# Patient Record
Sex: MALE | Race: ASIAN | URBAN - METROPOLITAN AREA
[De-identification: names, ages, dates, MRNs, and addresses within clinical notes are randomized per-mention and may not be internally consistent; named-entity substitution may affect disease eponyms.]

---

## 2019-10-18 ENCOUNTER — TRANSFERRED RECORDS (OUTPATIENT)
Dept: HEALTH INFORMATION MANAGEMENT | Facility: CLINIC | Age: 19
End: 2019-10-18

## 2019-10-18 ENCOUNTER — MEDICAL CORRESPONDENCE (OUTPATIENT)
Dept: HEALTH INFORMATION MANAGEMENT | Facility: CLINIC | Age: 19
End: 2019-10-18

## 2019-10-21 NOTE — TELEPHONE ENCOUNTER
FUTURE VISIT INFORMATION      FUTURE VISIT INFORMATION:    Date: 10/25/2019    Time: 1:30 PM    Location: [facility]   REFERRAL INFORMATION:    Referring provider:  Dorothy Poe PA-C    Referring providers clinic:  [facility]    Reason for visit/diagnosis  Phantosmia, Pt smells smoke all the time     RECORDS REQUESTED FROM:       Clinic name Comments Records Status Imaging Status   [facility] 10/18/19 Office visit with Dorothy Poe PA-C, Referral  Scanned in EPIC                  Action 10/21/19 12:28 pm    Action Taken Request sent to [facility] ([phone]) for referral and office notes. Óscar

## 2019-10-25 ENCOUNTER — PRE VISIT (OUTPATIENT)
Dept: OTOLARYNGOLOGY | Facility: CLINIC | Age: 19
End: 2019-10-25

## 2020-07-31 ENCOUNTER — MEDICAL CORRESPONDENCE (OUTPATIENT)
Dept: HEALTH INFORMATION MANAGEMENT | Facility: CLINIC | Age: 20
End: 2020-07-31

## 2020-07-31 ENCOUNTER — TRANSFERRED RECORDS (OUTPATIENT)
Dept: HEALTH INFORMATION MANAGEMENT | Facility: CLINIC | Age: 20
End: 2020-07-31

## 2020-08-03 ENCOUNTER — TRANSCRIBE ORDERS (OUTPATIENT)
Dept: OTHER | Age: 20
End: 2020-08-03

## 2020-08-03 DIAGNOSIS — H93.13 TINNITUS OF BOTH EARS: Primary | ICD-10-CM

## 2021-02-02 ENCOUNTER — NURSE TRIAGE (OUTPATIENT)
Dept: NURSING | Facility: CLINIC | Age: 21
End: 2021-02-02

## 2021-02-02 NOTE — TELEPHONE ENCOUNTER
Angel is having a bad case of tinitis in his ears and bright lights makes him dizzy.  Ringing started yesterday.  Angel states that he will phone [facility] to schedule an appointment.  Denies fever cough and shortness of breath.  Angel does not want to go to ED.    COVID 19 Nurse Triage Plan/Patient Instructions    Please be aware that novel coronavirus (COVID-19) may be circulating in the community. If you develop symptoms such as fever, cough, or SOB or if you have concerns about the presence of another infection including coronavirus (COVID-19), please contact your health care provider or visit [email or web address].     Disposition/Instructions    In-Person Visit with provider recommended. Reference Visit Selection Guide.    Thank you for taking steps to prevent the spread of this virus.  o Limit your contact with others.  o Wear a simple mask to cover your cough.  o Wash your hands well and often.    Resources    [facility]: About COVID-19: [email or web address]    CDC: What to Do If You're Sick: www.cdc.gov/coronavirus/2019-ncov/about/steps-when-sick.html    CDC: Ending Home Isolation: www.cdc.gov/coronavirus/2019-ncov/hcp/disposition-in-home-patients.html     CDC: Caring for Someone: www.cdc.gov/coronavirus/2019-ncov/if-you-are-sick/care-for-someone.html     [facility]: Interim Guidance for Hospital Discharge to Home: www.health.[facility].[place].us/diseases/coronavirus/hcp/hospdischarge.pdf    [facility] clinical trials (COVID-19 research studies): clinicalaffairs.[facility]-clinical-trials     Below are the COVID-19 hotlines at the [facility] ([facility]). Interpreters are available.   o For health questions: Call 645-544-2944 or 1-410.456.5325 (7 a.m. to 7 p.m.)  o For questions about schools and childcare: Call 307-383-7275 or 1-685.565.3264 (7 a.m. to 7 p.m.)                       Additional Information    Negative: Patient sounds very sick or weak to the triager    Negative: [1] Hearing  loss in one or both ears AND [2] sudden onset AND [3] present now    Negative: Ear is painful    Negative: Decreased hearing followed sudden, extremely loud noise (e.g., explosion, not just loud concert)    Negative: Taking medication that can damage hearing (i.e., gentamycin, tobramycin, furosemide, ethacrynic acid, cisplatin, quinidine)    Negative: Long-term (current) use of aspirin    MODERATE-SEVERE tinnitus (i.e., interferes with work, school, or sleep)    Protocols used: TINNITUS-A-AH